# Patient Record
Sex: MALE | Race: ASIAN | NOT HISPANIC OR LATINO | URBAN - METROPOLITAN AREA
[De-identification: names, ages, dates, MRNs, and addresses within clinical notes are randomized per-mention and may not be internally consistent; named-entity substitution may affect disease eponyms.]

---

## 2019-03-31 ENCOUNTER — EMERGENCY (EMERGENCY)
Facility: HOSPITAL | Age: 74
LOS: 1 days | Discharge: HOME | End: 2019-03-31
Attending: EMERGENCY MEDICINE
Payer: COMMERCIAL

## 2019-03-31 VITALS
HEART RATE: 84 BPM | WEIGHT: 130.07 LBS | SYSTOLIC BLOOD PRESSURE: 179 MMHG | TEMPERATURE: 98 F | DIASTOLIC BLOOD PRESSURE: 87 MMHG | HEIGHT: 66 IN | OXYGEN SATURATION: 97 % | RESPIRATION RATE: 18 BRPM

## 2019-03-31 PROCEDURE — 99283 EMERGENCY DEPT VISIT LOW MDM: CPT

## 2019-03-31 PROCEDURE — 99284 EMERGENCY DEPT VISIT MOD MDM: CPT

## 2019-03-31 RX ADMIN — Medication 1 TABLET(S): at 21:27

## 2019-03-31 NOTE — ED PROVIDER NOTE - OBJECTIVE STATEMENT
73 M c/o dog bite from family friend's dog that he was watching and was bathing the dog and the dog bit him. THis happened 4 days ago. Is not c/o pain, weakness of hands or fingers or numbness. Has not had daily function impacted by it. Concerned due to dog does not have vaccines per the pt reporting from the dog owner. 73 M c/o dog bite from family friend's dog that he was watching and was bathing the dog and the dog bit him. THis happened 4 days ago. Is not c/o pain, weakness of hands or fingers or numbness. Has not had daily function impacted by it. Concerned due to dog does not have vaccines per the pt reporting from the dog owner.  **ATTENDING ADDENDUM (Dr. Carlos Zaidi): I attest that I have directly and personally interviewed and examined this patient and elicited a comparable history of present illness and review of systems as documented, along with my EM resident. I attest that I have made significant contributions to the documentation where necessary and as noted in the EMR. 73 M c/o dog bite from family friend's dog that he was watching and was bathing the dog and the dog bit him. This happened 4 days ago. Is not c/o pain, weakness of hands or fingers or numbness. Has not had daily function impacted by it. Concerned due to dog does not have vaccines per the pt reporting from the dog owner.  **ATTENDING ADDENDUM (Dr. Carlos Zaidi): I attest that I have directly and personally interviewed and examined this patient and elicited a comparable history of present illness and review of systems as documented, along with my EM resident. I attest that I have made significant contributions to the documentation where necessary and as noted in the EMR.

## 2019-03-31 NOTE — ED PROVIDER NOTE - PLAN OF CARE
**ATTENDING ADDENDUM (Dr. Carlos Zaidi): Goals of care include resolution of emergent/urgent symptoms and concerns, and restoration to baseline level of homeostasis.

## 2019-03-31 NOTE — ED PROVIDER NOTE - CLINICAL SUMMARY MEDICAL DECISION MAKING FREE TEXT BOX
73 M w/ dog bite 4d ago as far as he knows he has been in contact with the owner and the dog has been acting normally however is concerned b/c dog has not had rabies shot per owner (owner is not the pt). No deficits on exam. Plan: Reported bite and owner name and ph# to [facility], [facility] to follow up w/ dog owner per  at [facility], supportive caer. 73 M w/ dog bite 4d ago as far as he knows he has been in contact with the owner and the dog has been acting normally however is concerned b/c dog has not had rabies shot per owner (owner is not the pt). No deficits on exam. Plan: Reported bite and owner name and ph# to [facility], [facility] to follow up w/ dog owner per  at [facility], supportive care.  **ATTENDING MEDICAL DECISION MAKING/SYNTHESIS (Dr. Carlos Zaidi): I have reviewed the Chief Complaint, the HPI, the ROS, and have directly performed and confirmed the findings on the Physical Examination. I have reviewed the medical decision making with all providers, as applicable. The PROBLEM REPRESENTATION at this time is: 73-year-old man with right hand dominance now presenting with dog bite four days ago (healing noted). Bite to right metacarpal phalangeal joint head. NO fevers or chills. NO lymphangitic spread, streaking or severe warmth. Here with concern for Rabies PEP. The MOST LIKELY DIAGNOSIS, and the LIST OF DIFFERENTIAL DIAGNOSES, includes (but is not limited to) the following: cellulitis, septic arthritis (NO evidence), tendon or bony injury (NO evidence), lymphangitic spread (NO evidence), serious bacterial infection or sepsis/severe sepsis, fracture or dislocation. The likelihood of each of these diagnoses has been appropriately considered in the context of this patient's presentation and my evaluation. PLAN: as described in EMR, including diagnostics, therapeutics and consultation as clinically warranted. I will continue to reevaluate the patient, including the results of all testing, and monitor response to therapy throughout the patient's course in the ED. 73 M w/ dog bite 4d ago as far as he knows he has been in contact with the owner and the dog has been acting normally however is concerned b/c dog has not had rabies shot per owner (owner is not the pt). No deficits on exam. Plan: Reported bite and owner name and ph# to [facility], [facility] to follow up w/ dog owner per  at [facility], supportive care.  **ATTENDING MEDICAL DECISION MAKING/SYNTHESIS (Dr. Carlos Zaidi): I have reviewed the Chief Complaint, the HPI, the ROS, and have directly performed and confirmed the findings on the Physical Examination. I have reviewed the medical decision making with all providers, as applicable. The PROBLEM REPRESENTATION at this time is: 73-year-old man with right hand dominance now presenting with dog bite four days ago (healing noted). Bite to right second metacarpal phalangeal joint head. NO fevers or chills. NO lymphangitic spread, streaking or severe warmth. Here with concern for Rabies PEP. The MOST LIKELY DIAGNOSIS, and the LIST OF DIFFERENTIAL DIAGNOSES, includes (but is not limited to) the following: cellulitis, septic arthritis (NO evidence), tendon or bony injury (NO evidence), lymphangitic spread (NO evidence), serious bacterial infection or sepsis/severe sepsis, fracture or dislocation. The likelihood of each of these diagnoses has been appropriately considered in the context of this patient's presentation and my evaluation. PLAN: as described in EMR, including diagnostics, therapeutics and consultation as clinically warranted. I will continue to reevaluate the patient, including the results of all testing, and monitor response to therapy throughout the patient's course in the ED.

## 2019-03-31 NOTE — ED ADULT NURSE NOTE - OBJECTIVE STATEMENT
Pt presents to ED after dog bite thurs, AXOX3, pt was dogsitting friends dog, per dog owner animal is not current with all shots, unknown which, pt has undressed puncture wound to R hand, slight reddening noted to area, no fevers or chills, no nausea vomiting or diarrhea, no shortness of breath, no chest pain,

## 2019-03-31 NOTE — ED PROVIDER NOTE - MDM PATIENT STATEMENT FOR ADDL TREATMENT
No, the patient is not being discharged from [facility] Patient with one or more new problems requiring additional work-up/treatment.

## 2019-03-31 NOTE — ED PROVIDER NOTE - ANIMAL IMMUNIZATION STATUS
**ATTENDING ADDENDUM (Dr. Carlos Zaidi): patient reports concern as dog is domesticated but patient does not know about initial vaccine series and/or booster vaccinations./unknown

## 2019-03-31 NOTE — ED PROVIDER NOTE - AMOUNT OF DRAINAGE
**ATTENDING ADDENDUM (Dr. Carlos Zaidi): NO discharge or hemorrhage. NO fluctuance. NO lymphagitic spread. NO warmth.

## 2019-03-31 NOTE — ED PROVIDER NOTE - SKIN [+], MLM
ABRASION/**ATTENDING ADDENDUM (Dr. Carlos Zaidi): POSITIVE two small puncture wounds reported, healing. NO hemorrhage, streaking, erythema, or discharge.

## 2019-03-31 NOTE — ED PROVIDER NOTE - AUTHORITY NOTIFIED
yes/**ATTENDING ADDENDUM (Dr. Carlos Zaidi): [facility] BRAULIO called/notified by EM resident Nii.

## 2019-03-31 NOTE — ED PROVIDER NOTE - PHYSICAL EXAMINATION
General: Alert and Oriented. No apparent distress.  Head: Normocephalic and atraumatic.  Eyes: PERRLA with EOMI.  Neck: Supple. Trachea midline.   Cardiac: Normal S1 and S2 w/ RRR. No murmurs appreciated.   Pulmonary: Vesicular breath sounds bilaterally. No increased WOB. No wheezes or crackles.  Abdominal: Soft, non-tender. Normoactive bowel sounds.  Neurologic: No focal sensory or motor deficits.  Musculoskeletal: Strength appropriate in all 4 extremities for age with no limited ROM.  Skin: Color appropriate for race. Intact, warm, and well-perfused.  Psychiatric: Appropriate mood and affect. No apparent risk to self or others. General: Alert and Oriented. No apparent distress.  Head: Normocephalic and atraumatic.  Eyes: PERRLA with EOMI.  Neck: Supple. Trachea midline.   Cardiac: Normal S1 and S2 w/ RRR. No murmurs appreciated.   Pulmonary: Vesicular breath sounds bilaterally. No increased WOB. No wheezes or crackles.  Abdominal: Soft, non-tender. Normoactive bowel sounds.  Neurologic: No focal sensory or motor deficits.  Musculoskeletal: Strength appropriate in all 4 extremities for age with no limited ROM.  Skin: Color appropriate for race. Intact, warm, and well-perfused.  Psychiatric: Appropriate mood and affect. No apparent risk to self or others.  **ATTENDING ADDENDUM (Dr. Carlos Zaidi): I have reviewed and substantially contributed to the elements of the PE as documented above. I have directly performed an examination of this patient in conjunction with the other members (EM resident/PA/NP) of the patient care team.

## 2019-03-31 NOTE — ED PROVIDER NOTE - CHIEF COMPLAINT
The patient is a 73y Male complaining of bite, animal. The patient is a 73y Male complaining of dog bite (known dog) four days ago, now with healing wounds to right second metacarpal and concern for possible rabies exposure.

## 2019-03-31 NOTE — ED PROVIDER NOTE - ANIMAL CAPTIVITY
**ATTENDING ADDENDUM (Dr. Carlos Zaidi): friend's dog (known dog), was washing dog, dog nipped at patient's right hand/in captivity

## 2019-03-31 NOTE — ED PROVIDER NOTE - ATTENDING CONTRIBUTION TO CARE
**ATTENDING ADDENDUM (Dr. Carlos Zaidi): I attest that I have directly examined this patient and reviewed and formulated the diagnostic and therapeutic management plan in collaboration with the EM resident. Please see MDM note and remainder of EMR for findings from CC, HPI, ROS, and PE. (Nii)
Private car

## 2019-03-31 NOTE — ED PROVIDER NOTE - NSFOLLOWUPINSTRUCTIONS_ED_ALL_ED_FT
Please seek care if you have new chest pain, shortness of breath, fevers, inability to eat or drink, or worsening of symptoms that brought you to the emergency room today.  Please follow up with your primary care physician in 1-2 days or as soon as possible.    Call the department of health at [phone] to follow up regarding possible rabies in the dog that bit you. If your situation allows, continue contact with the owner of the dog and if the dog has any abnormal behavior or exhibits signs of illness (foaming at the mouth, biting other humans, acting strangely), then return to the ER.

## 2019-03-31 NOTE — ED PROVIDER NOTE - PROGRESS NOTE DETAILS
**ATTENDING ADDENDUM (Dr. Carlos Zaidi): agree with EM resident Nii, who called [place] [facility] re: recommendations for Rabies postexposure prophylaxis. Will continue to observe and monitor closely. Explained to pt that because the dog is available for monitoring and Charis has the dog's info as well as pt's, CHARIS will f/u about the dog and that they do not need to return as long as they can verify Explained to pt that because the dog is available for monitoring and Charis has the dog's info as well as pt's, [facility]IS will f/u about the dog and that they do not need to return as long as they can verify that the dog is not having changes in behavior or becomign withdrawn or having other signs of illness with the CHARIS or by calling owner themselves. **ATTENDING ADDENDUM (Dr. Carlos Zaidi): agree with EM resident Nii, who called [place] [facility] re: recommendations for rabies postexposure prophylaxis. Will continue to observe and monitor closely. Explained to pt that because the dog is available for monitoring and Charis has the dog's info as well as pt's, CHARIS will f/u about the dog and that they do not need to return as long as they can verify that the dog is not having changes in behavior or becomign withdrawn or having other signs of illness with the CHARIS or by calling owner themselves.  **ATTENDING ADDENDUM (Dr. Carlos Zaidi): agree with above notation by EM resident Nii. Anticipatory guidance provided by ED team throughout ED course. Agree with discharge home with medications as prescribed with close outpatient followup with primary care physician/provider and continued followup with [facility].

## 2019-03-31 NOTE — ED PROVIDER NOTE - CARE PLAN
Principal Discharge DX:	Bite by animal  Goal:	**ATTENDING ADDENDUM (Dr. Carlos Zaidi): Goals of care include resolution of emergent/urgent symptoms and concerns, and restoration to baseline level of homeostasis.

## 2019-03-31 NOTE — ED PROVIDER NOTE - UPPER EXTREMITY EXAM, RIGHT
**ATTENDING ADDENDUM (Dr. Carlos Zaidi): POSITIVE minimal to NO soft-tissue swelling. POSITIVE mild erythema, without streaking, warmth, rotational or other bony deformity, distal discoloration, or fusiform soft-tissue swelling. NO pain with passive extension, passive range of motion, or active motion. NO tenderness along dorsal or volar aspects of second finger or second metacarpal.

## 2022-09-21 NOTE — ED PROVIDER NOTE - MUSCULOSKELETAL [+], MLM
Clothing **ATTENDING ADDENDUM (Dr. Carlos Zaidi): POSITIVE right hand pain, now resolved./JOINT PAIN